# Patient Record
Sex: FEMALE | Race: WHITE | Employment: FULL TIME | URBAN - NONMETROPOLITAN AREA
[De-identification: names, ages, dates, MRNs, and addresses within clinical notes are randomized per-mention and may not be internally consistent; named-entity substitution may affect disease eponyms.]

---
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
## 2024-12-18 ENCOUNTER — TELEPHONE (OUTPATIENT)
Dept: FAMILY MEDICINE CLINIC | Age: 19
End: 2024-12-18

## 2024-12-18 NOTE — TELEPHONE ENCOUNTER
----- Message from Tejas SHELDON sent at 12/18/2024  3:09 PM EST -----  Regarding: [facility] Appointment Request  [facility] Appointment Request    Patient needs appointment for [facility] Appointment Type: New to Provider.    Patient Requested Dates(s): Anyday.  Patient Requested Time: if Friday should be a morning appointment. If any day afternoon appointments.  Provider Name: Alicia Calderon MD    Reason for Appointment Request: New Patient - Requested Provider unavailable  --------------------------------------------------------------------------------------------------------------------------    Relationship to Patient: Self     Call Back Information: OK to leave message on voicemail  Preferred Call Back Number: Phone [phone] (home)

## 2024-12-18 NOTE — TELEPHONE ENCOUNTER
Patients mother and father are patient's of yours. Are you OK with scheduling her as a new Patient.

## 2025-01-07 SDOH — HEALTH STABILITY: PHYSICAL HEALTH: ON AVERAGE, HOW MANY MINUTES DO YOU ENGAGE IN EXERCISE AT THIS LEVEL?: 50 MIN

## 2025-01-07 SDOH — HEALTH STABILITY: PHYSICAL HEALTH: ON AVERAGE, HOW MANY DAYS PER WEEK DO YOU ENGAGE IN MODERATE TO STRENUOUS EXERCISE (LIKE A BRISK WALK)?: 6 DAYS

## 2025-01-08 ENCOUNTER — OFFICE VISIT (OUTPATIENT)
Dept: FAMILY MEDICINE CLINIC | Age: 20
End: 2025-01-08
Payer: COMMERCIAL

## 2025-01-08 VITALS
SYSTOLIC BLOOD PRESSURE: 118 MMHG | WEIGHT: 161.8 LBS | BODY MASS INDEX: 21.91 KG/M2 | HEART RATE: 55 BPM | RESPIRATION RATE: 16 BRPM | DIASTOLIC BLOOD PRESSURE: 72 MMHG | HEIGHT: 72 IN | OXYGEN SATURATION: 99 %

## 2025-01-08 DIAGNOSIS — Z86.19 HISTORY OF INFECTIOUS MONONUCLEOSIS: ICD-10-CM

## 2025-01-08 DIAGNOSIS — R19.5 LOOSE STOOLS: Primary | ICD-10-CM

## 2025-01-08 DIAGNOSIS — R41.89 BRAIN FOG: ICD-10-CM

## 2025-01-08 DIAGNOSIS — L20.9 ATOPIC DERMATITIS, UNSPECIFIED TYPE: ICD-10-CM

## 2025-01-08 DIAGNOSIS — E55.9 VITAMIN D DEFICIENCY: ICD-10-CM

## 2025-01-08 DIAGNOSIS — R53.83 FATIGUE, UNSPECIFIED TYPE: ICD-10-CM

## 2025-01-08 PROCEDURE — 99215 OFFICE O/P EST HI 40 MIN: CPT | Performed by: FAMILY MEDICINE

## 2025-01-08 RX ORDER — LORATADINE 10 MG/1
10 TABLET ORAL DAILY
COMMUNITY

## 2025-01-08 SDOH — ECONOMIC STABILITY: FOOD INSECURITY: WITHIN THE PAST 12 MONTHS, THE FOOD YOU BOUGHT JUST DIDN'T LAST AND YOU DIDN'T HAVE MONEY TO GET MORE.: NEVER TRUE

## 2025-01-08 SDOH — ECONOMIC STABILITY: FOOD INSECURITY: WITHIN THE PAST 12 MONTHS, YOU WORRIED THAT YOUR FOOD WOULD RUN OUT BEFORE YOU GOT MONEY TO BUY MORE.: NEVER TRUE

## 2025-01-08 ASSESSMENT — PATIENT HEALTH QUESTIONNAIRE - PHQ9
SUM OF ALL RESPONSES TO PHQ QUESTIONS 1-9: 0
SUM OF ALL RESPONSES TO PHQ QUESTIONS 1-9: 0
1. LITTLE INTEREST OR PLEASURE IN DOING THINGS: NOT AT ALL
2. FEELING DOWN, DEPRESSED OR HOPELESS: NOT AT ALL
SUM OF ALL RESPONSES TO PHQ QUESTIONS 1-9: 0
SUM OF ALL RESPONSES TO PHQ QUESTIONS 1-9: 0
SUM OF ALL RESPONSES TO PHQ9 QUESTIONS 1 & 2: 0

## 2025-01-08 NOTE — PATIENT INSTRUCTIONS
Increasing soluble fiber:  Try adding soluble fiber to help with bowel trouble    -- 1 to 2 TBSP of flax seed meal, mixed in oatmeal or yogurt or smoothie  -- 1/2 cup oatmeal or oats (like a muslie)  -- 1 tsp of Psyllium Husk in 8oz of water and then increase to 2 tsp if not seeing results, but increase to 12 oz minimum    Take 1 of the above daily and then double up if needed    Adding  probiotics:  You will want to choose a multi-strain (usually lactobacillus and bifidus species)  --  5 billion colony forming units (CFUs) at least   Some brands more easily found locally:  --Culturelle, Florastor, Digestive Advantage  Others with good reviews:  -- RenewLife, probonix (online)    Adding collagen:   Type I and Type III    Check out \"Classical Stretch\" by Laquita Willoughby.   Technique of "[employer]"entrics to gently stretch muscle and strengthen them.    Aim to stretch several 5 -10 minutes daily if you have chronic joint trouble.  For the next level of health, aim for 30 minutes of stretching and aerobic work out 3 times a week.  She has website, streaming options and youtube videos.      Www.[employer]/videos/     Here are mini-workout examples: -- there are many others:    For neck and upper back pain,     AUTOFACT mini workout for upper body pain relief (under 5 min.)  Https://www.exozetube.com/watch?v=UHGkRxj4XxN&t=9s    AUTOFACT Workout - Upper Back and Shoulder Pain-Relief Exercises (10 min.)  https://www.youPicturaeube.com/watch?v=LhZTbuabwxg      For back pain and tight body joints,    [employer] Aging Backwards #5 - Relieve Your Pain (under 10 min.)  Https://www.youtube.com/watch?v=XDEyxiwkioQ    "[employer]"entrics Aging Backwards #3 - Sooth Your Joints  https://www.youPicturaeube.com/watch?v=SR2dorZ7TqY

## 2025-01-08 NOTE — PROGRESS NOTES
Shanti Wallace (:  2005) is a 19 y.o. female,Established patient, here for evaluation of the following chief complaint(s):  Establish Care      Assessment & Plan   ASSESSMENT/PLAN:  1. Loose stools  -     CBC with Auto Differential; Future  -     Vitamin D 25 Hydroxy; Future  -     Ferritin; Future  -     Iron; Future  2. Vitamin D deficiency  -     CBC with Auto Differential; Future  -     Vitamin D 25 Hydroxy; Future  -     Ferritin; Future  -     Iron; Future  3. Fatigue, unspecified type  -     CBC with Auto Differential; Future  -     Vitamin D 25 Hydroxy; Future  -     Ferritin; Future  -     Iron; Future  4. Atopic dermatitis, unspecified type  5. History of infectious mononucleosis  6. Brain fog    Patient's symptoms ongoing for a while but currently appear to be mild with examination that is benign.  We discussed various etiologies including those that would need further imaging and referral to GI.  She may have some lactose intolerance.  Has some food allergy -- contamination issues are considered but she is reading labels carefully.  In the absence of red flags, we will start with diet modifications and trial of supplements including increasing foods that have soluble fiber, then a trial of probiotics and then a trial of collagen.  Patient will give a month in between each trial to see what helps the most and best.  She may need a combination of all of them.  If there is no improvement we will obtain for further workup.  Blood work as above    Return in about 5 months (around 2025).         Subjective   SUBJECTIVE/OBJECTIVE:  HPI    Patient is a new patient to our clinic but has been seen within [facility] physician group for years.  She felt more comfortable coming here.  She is currently busy with work and going to school.  She is concerned about some loose stools that have become a bit more frequent.  She has concerns that she is on the road, and has felt some increased urgency to

## 2025-01-10 ENCOUNTER — LAB (OUTPATIENT)
Dept: LAB | Age: 20
End: 2025-01-10

## 2025-01-10 DIAGNOSIS — E55.9 VITAMIN D DEFICIENCY: ICD-10-CM

## 2025-01-10 DIAGNOSIS — R53.83 FATIGUE, UNSPECIFIED TYPE: ICD-10-CM

## 2025-01-10 DIAGNOSIS — R19.5 LOOSE STOOLS: ICD-10-CM

## 2025-01-10 LAB
25(OH)D3 SERPL-MCNC: 26 NG/ML (ref 30–100)
BASOPHILS ABSOLUTE: 0.1 THOU/MM3 (ref 0–0.1)
BASOPHILS NFR BLD AUTO: 0.7 %
DEPRECATED RDW RBC AUTO: 42.7 FL (ref 35–45)
EOSINOPHIL NFR BLD AUTO: 2.2 %
EOSINOPHILS ABSOLUTE: 0.2 THOU/MM3 (ref 0–0.4)
ERYTHROCYTE [DISTWIDTH] IN BLOOD BY AUTOMATED COUNT: 12 % (ref 11.5–14.5)
FERRITIN SERPL IA-MCNC: 60 NG/ML (ref 10–291)
HCT VFR BLD AUTO: 40.8 % (ref 37–47)
HGB BLD-MCNC: 13.4 GM/DL (ref 12–16)
IMM GRANULOCYTES # BLD AUTO: 0.01 THOU/MM3 (ref 0–0.07)
IMM GRANULOCYTES NFR BLD AUTO: 0.1 %
IRON SERPL-MCNC: 128 UG/DL (ref 50–170)
LYMPHOCYTES ABSOLUTE: 2.3 THOU/MM3 (ref 1–4.8)
LYMPHOCYTES NFR BLD AUTO: 30 %
MCH RBC QN AUTO: 32 PG (ref 26–33)
MCHC RBC AUTO-ENTMCNC: 32.8 GM/DL (ref 32.2–35.5)
MCV RBC AUTO: 97.4 FL (ref 81–99)
MONOCYTES ABSOLUTE: 0.7 THOU/MM3 (ref 0.4–1.3)
MONOCYTES NFR BLD AUTO: 9 %
NEUTROPHILS ABSOLUTE: 4.4 THOU/MM3 (ref 1.8–7.7)
NEUTROPHILS NFR BLD AUTO: 58 %
NRBC BLD AUTO-RTO: 0 /100 WBC
PLATELET # BLD AUTO: 275 THOU/MM3 (ref 130–400)
PMV BLD AUTO: 11.2 FL (ref 9.4–12.4)
RBC # BLD AUTO: 4.19 MILL/MM3 (ref 4.2–5.4)
WBC # BLD AUTO: 7.6 THOU/MM3 (ref 4.8–10.8)

## 2025-01-11 PROBLEM — R41.89 BRAIN FOG: Status: ACTIVE | Noted: 2025-01-11

## 2025-01-11 ASSESSMENT — ENCOUNTER SYMPTOMS
CONSTIPATION: 0
BLOOD IN STOOL: 0
COLOR CHANGE: 0
VOMITING: 0
RECTAL PAIN: 0
SHORTNESS OF BREATH: 0
DIARRHEA: 1
SORE THROAT: 0
NAUSEA: 0
ABDOMINAL PAIN: 0

## 2025-01-11 NOTE — RESULT ENCOUNTER NOTE
Please let patient know that her iron levels appear adequate but her vitamin D is not optimal.    -- she may start a daily vitamin D3 5000 units daily  -- a recheck in 3 mo is recommended.

## 2025-01-13 ENCOUNTER — TELEPHONE (OUTPATIENT)
Dept: FAMILY MEDICINE CLINIC | Age: 20
End: 2025-01-13

## 2025-01-13 NOTE — TELEPHONE ENCOUNTER
----- Message from Dr. Alicia Calderon MD sent at 1/11/2025  8:34 AM EST -----  Please let patient know that her iron levels appear adequate but her vitamin D is not optimal.    -- she may start a daily vitamin D3 5000 units daily  -- a recheck in 3 mo is recommended.

## 2025-03-14 ENCOUNTER — TELEMEDICINE (OUTPATIENT)
Dept: FAMILY MEDICINE CLINIC | Age: 20
End: 2025-03-14
Payer: COMMERCIAL

## 2025-03-14 DIAGNOSIS — L20.9 ATOPIC DERMATITIS, UNSPECIFIED TYPE: ICD-10-CM

## 2025-03-14 DIAGNOSIS — M25.50 ARTHRALGIA, UNSPECIFIED JOINT: Primary | ICD-10-CM

## 2025-03-14 DIAGNOSIS — R19.5 LOOSE STOOLS: ICD-10-CM

## 2025-03-14 DIAGNOSIS — R41.89 BRAIN FOG: ICD-10-CM

## 2025-03-14 PROCEDURE — 99214 OFFICE O/P EST MOD 30 MIN: CPT | Performed by: FAMILY MEDICINE

## 2025-03-20 ENCOUNTER — HOSPITAL ENCOUNTER (OUTPATIENT)
Age: 20
Discharge: HOME | End: 2025-03-20
Payer: COMMERCIAL

## 2025-03-20 DIAGNOSIS — M25.50 ARTHRALGIA, UNSPECIFIED JOINT: ICD-10-CM

## 2025-03-20 DIAGNOSIS — R19.5 LOOSE STOOLS: ICD-10-CM

## 2025-03-20 DIAGNOSIS — R41.89 BRAIN FOG: ICD-10-CM

## 2025-03-20 DIAGNOSIS — L20.9 ATOPIC DERMATITIS, UNSPECIFIED TYPE: ICD-10-CM

## 2025-03-20 LAB
BASOPHILS ABSOLUTE: 0 THOU/MM3 (ref 0–0.1)
BASOPHILS NFR BLD AUTO: 0.5 %
DEPRECATED RDW RBC AUTO: 45.4 FL (ref 35–45)
EOSINOPHIL NFR BLD AUTO: 2.7 %
EOSINOPHILS ABSOLUTE: 0.2 THOU/MM3 (ref 0–0.4)
ERYTHROCYTE [DISTWIDTH] IN BLOOD BY AUTOMATED COUNT: 12.8 % (ref 11.5–14.5)
ERYTHROCYTE [SEDIMENTATION RATE] IN BLOOD BY WESTERGREN METHOD: 2 MM/HR (ref 0–20)
HCT VFR BLD AUTO: 42.3 % (ref 37–47)
HGB BLD-MCNC: 13.7 GM/DL (ref 12–16)
IMM GRANULOCYTES # BLD AUTO: 0.02 THOU/MM3 (ref 0–0.07)
IMM GRANULOCYTES NFR BLD AUTO: 0.2 %
LYMPHOCYTES ABSOLUTE: 2.3 THOU/MM3 (ref 1–4.8)
LYMPHOCYTES NFR BLD AUTO: 27 %
MCH RBC QN AUTO: 31.4 PG (ref 26–33)
MCHC RBC AUTO-ENTMCNC: 32.4 GM/DL (ref 32.2–35.5)
MCV RBC AUTO: 97 FL (ref 81–99)
MONOCYTES ABSOLUTE: 0.7 THOU/MM3 (ref 0.4–1.3)
MONOCYTES NFR BLD AUTO: 8.1 %
NEUTROPHILS ABSOLUTE: 5.2 THOU/MM3 (ref 1.8–7.7)
NEUTROPHILS NFR BLD AUTO: 61.5 %
NRBC BLD AUTO-RTO: 0 /100 WBC
PLATELET # BLD AUTO: 276 THOU/MM3 (ref 130–400)
PMV BLD AUTO: 11.2 FL (ref 9.4–12.4)
RBC # BLD AUTO: 4.36 MILL/MM3 (ref 4.2–5.4)
WBC # BLD AUTO: 8.4 THOU/MM3 (ref 4.8–10.8)

## 2025-03-20 PROCEDURE — 80053 COMPREHEN METABOLIC PANEL: CPT

## 2025-03-20 PROCEDURE — 85651 RBC SED RATE NONAUTOMATED: CPT

## 2025-03-20 PROCEDURE — 86258 DGP ANTIBODY EACH IG CLASS: CPT

## 2025-03-20 PROCEDURE — 84443 ASSAY THYROID STIM HORMONE: CPT

## 2025-03-20 PROCEDURE — 86364 TISS TRNSGLTMNASE EA IG CLAS: CPT

## 2025-03-20 PROCEDURE — 86140 C-REACTIVE PROTEIN: CPT

## 2025-03-20 PROCEDURE — 82746 ASSAY OF FOLIC ACID SERUM: CPT

## 2025-03-20 PROCEDURE — 36415 COLL VENOUS BLD VENIPUNCTURE: CPT

## 2025-03-20 PROCEDURE — 85025 COMPLETE CBC W/AUTO DIFF WBC: CPT

## 2025-03-20 PROCEDURE — 82607 VITAMIN B-12: CPT

## 2025-03-20 PROCEDURE — 86038 ANTINUCLEAR ANTIBODIES: CPT

## 2025-03-20 PROCEDURE — 86039 ANTINUCLEAR ANTIBODIES (ANA): CPT

## 2025-03-21 LAB
ALBUMIN SERPL BCG-MCNC: 4.6 G/DL (ref 3.4–4.9)
ALP SERPL-CCNC: 65 U/L (ref 35–104)
ALT SERPL W/O P-5'-P-CCNC: 19 U/L (ref 10–35)
ANION GAP SERPL CALC-SCNC: 11 MEQ/L (ref 8–16)
AST SERPL-CCNC: 19 U/L (ref 10–35)
BILIRUB SERPL-MCNC: 0.2 MG/DL (ref 0.3–1.2)
BUN SERPL-MCNC: 14 MG/DL (ref 8–23)
CALCIUM SERPL-MCNC: 9.9 MG/DL (ref 8.6–10)
CHLORIDE SERPL-SCNC: 102 MEQ/L (ref 98–111)
CO2 SERPL-SCNC: 26 MEQ/L (ref 22–29)
CREAT SERPL-MCNC: 0.8 MG/DL (ref 0.5–0.9)
CRP SERPL-MCNC: < 0.3 MG/DL (ref 0–0.5)
FOLATE SERPL-MCNC: 7.1 NG/ML (ref 4.6–34.8)
GFR SERPL CREATININE-BSD FRML MDRD: > 90 ML/MIN/1.73M2
GLUCOSE SERPL-MCNC: 80 MG/DL (ref 74–109)
POTASSIUM SERPL-SCNC: 4.1 MEQ/L (ref 3.5–5.2)
PROT SERPL-MCNC: 7.5 G/DL (ref 6.4–8.3)
SODIUM SERPL-SCNC: 139 MEQ/L (ref 135–145)
TSH SERPL DL<=0.05 MIU/L-ACNC: 2.37 UIU/ML (ref 0.27–4.2)
VIT B12 SERPL-MCNC: 568 PG/ML (ref 232–1245)

## 2025-03-23 LAB
GLIADIN PEPTIDE IGA SER IA-ACNC: < 0.72 FLU (ref 0–4.99)
GLIADIN PEPTIDE IGG SER IA-ACNC: 0.76 FLU (ref 0–4.99)
NUCLEAR IGG SER QL IA: DETECTED
TTG IGA SER IA-ACNC: < 1.02 FLU (ref 0–4.99)
TTG IGG SER IA-ACNC: < 0.82 FLU (ref 0–4.99)

## 2025-03-24 LAB
ANA PAT SER IF-IMP: ABNORMAL
ANA PAT SER IF-IMP: ABNORMAL
NUCLEAR IGG SER QL IF: DETECTED
NUCLEAR IGG TITR SER IF: ABNORMAL {TITER}

## 2025-03-26 ENCOUNTER — PATIENT MESSAGE (OUTPATIENT)
Dept: FAMILY MEDICINE CLINIC | Age: 20
End: 2025-03-26

## 2025-03-26 ENCOUNTER — RESULTS FOLLOW-UP (OUTPATIENT)
Dept: FAMILY MEDICINE CLINIC | Age: 20
End: 2025-03-26

## 2025-05-05 ENCOUNTER — TELEMEDICINE (OUTPATIENT)
Dept: FAMILY MEDICINE CLINIC | Age: 20
End: 2025-05-05
Payer: COMMERCIAL

## 2025-05-05 DIAGNOSIS — J30.1 SEASONAL ALLERGIC RHINITIS DUE TO POLLEN: ICD-10-CM

## 2025-05-05 DIAGNOSIS — M25.50 ARTHRALGIA, UNSPECIFIED JOINT: ICD-10-CM

## 2025-05-05 DIAGNOSIS — M54.50 LUMBAR BACK PAIN: ICD-10-CM

## 2025-05-05 DIAGNOSIS — R76.8 ANA POSITIVE: ICD-10-CM

## 2025-05-05 DIAGNOSIS — R41.89 BRAIN FOG: ICD-10-CM

## 2025-05-05 DIAGNOSIS — M53.3 BACK PAIN, SACROILIAC: ICD-10-CM

## 2025-05-05 DIAGNOSIS — L50.9 HIVES: Primary | ICD-10-CM

## 2025-05-05 PROCEDURE — 99213 OFFICE O/P EST LOW 20 MIN: CPT | Performed by: FAMILY MEDICINE

## 2025-05-05 RX ORDER — PREDNISONE 10 MG/1
10 TABLET ORAL DAILY
Qty: 45 TABLET | Refills: 0 | Status: SHIPPED | OUTPATIENT
Start: 2025-05-05

## 2025-05-05 NOTE — PROGRESS NOTES
Shanti Wallace, was evaluated through a synchronous (real-time) audio-video encounter. The patient (or guardian if applicable) is aware that this is a billable service, which includes applicable co-pays. This Virtual Visit was conducted with patient's (and/or legal guardian's) consent. Patient identification was verified, and a caregiver was present when appropriate.   The patient was located at Home: [address]  [place] [address]  Provider was located at Facility (Appt Dept): [address]  [place] [address]  Confirm you are appropriately licensed, registered, or certified to deliver care in the state where the patient is located as indicated above. If you are not or unsure, please re-schedule the visit: Yes, I confirm.     Shanti Wallace (:  2005) is a Established patient, presenting virtually for evaluation of the following:      Below is the assessment and plan developed based on review of pertinent history, physical exam, labs, studies, and medications.     Diagnoses and all orders for this visit:    Hives  -     predniSONE (DELTASONE) 10 MG tablet; Take 1 tablet by mouth daily Take 5 tabs daily for 3 days, then 4 tabs daily for 3 days, then 3 tabs daily for 3 days, then 2 tabs daily for 3 days, then 1 tab daily for 3 days.    Arthralgia, unspecified joint  -     predniSONE (DELTASONE) 10 MG tablet; Take 1 tablet by mouth daily Take 5 tabs daily for 3 days, then 4 tabs daily for 3 days, then 3 tabs daily for 3 days, then 2 tabs daily for 3 days, then 1 tab daily for 3 days.    JANINE positive  -     predniSONE (DELTASONE) 10 MG tablet; Take 1 tablet by mouth daily Take 5 tabs daily for 3 days, then 4 tabs daily for 3 days, then 3 tabs daily for 3 days, then 2 tabs daily for 3 days, then 1 tab daily for 3 days.    Back pain, sacroiliac  -     predniSONE (DELTASONE) 10 MG tablet; Take 1 tablet by mouth daily Take 5 tabs daily for 3 days, then 4 tabs daily for 3 days,

## 2025-07-28 ENCOUNTER — HOSPITAL ENCOUNTER (OUTPATIENT)
Age: 20
Discharge: HOME | End: 2025-07-28
Payer: COMMERCIAL

## 2025-07-28 ENCOUNTER — HOSPITAL ENCOUNTER (OUTPATIENT)
Dept: GENERAL RADIOLOGY | Age: 20
Discharge: HOME | End: 2025-07-28
Payer: COMMERCIAL

## 2025-07-28 ENCOUNTER — OFFICE VISIT (OUTPATIENT)
Age: 20
End: 2025-07-28
Payer: COMMERCIAL

## 2025-07-28 VITALS
WEIGHT: 153.3 LBS | DIASTOLIC BLOOD PRESSURE: 72 MMHG | HEART RATE: 77 BPM | OXYGEN SATURATION: 97 % | SYSTOLIC BLOOD PRESSURE: 120 MMHG | BODY MASS INDEX: 20.77 KG/M2 | HEIGHT: 72 IN

## 2025-07-28 DIAGNOSIS — R76.8 POSITIVE ANA (ANTINUCLEAR ANTIBODY): ICD-10-CM

## 2025-07-28 DIAGNOSIS — R76.8 POSITIVE ANA (ANTINUCLEAR ANTIBODY): Primary | ICD-10-CM

## 2025-07-28 DIAGNOSIS — M25.50 MULTIPLE JOINT PAIN: ICD-10-CM

## 2025-07-28 DIAGNOSIS — K52.9 CHRONIC DIARRHEA: ICD-10-CM

## 2025-07-28 LAB
CRP SERPL-MCNC: < 0.3 MG/DL (ref 0–0.5)
ERYTHROCYTE [SEDIMENTATION RATE] IN BLOOD BY WESTERGREN METHOD: 1 MM/HR (ref 0–20)

## 2025-07-28 PROCEDURE — 36415 COLL VENOUS BLD VENIPUNCTURE: CPT

## 2025-07-28 PROCEDURE — 73562 X-RAY EXAM OF KNEE 3: CPT

## 2025-07-28 PROCEDURE — 99204 OFFICE O/P NEW MOD 45 MIN: CPT | Performed by: INTERNAL MEDICINE

## 2025-07-28 PROCEDURE — 85651 RBC SED RATE NONAUTOMATED: CPT

## 2025-07-28 PROCEDURE — 72202 X-RAY EXAM SI JOINTS 3/> VWS: CPT

## 2025-07-28 PROCEDURE — 86140 C-REACTIVE PROTEIN: CPT

## 2025-07-28 PROCEDURE — 86160 COMPLEMENT ANTIGEN: CPT

## 2025-07-28 RX ORDER — FERROUS SULFATE 325(65) MG
325 TABLET ORAL
COMMUNITY

## 2025-07-28 ASSESSMENT — RHEUMATOLOGY NEW PATIENT QUESTIONNAIRE
UNUSUAL FATIGUE: Y
INCREASED SUSCEPTIBILITY TO INFECTION: Y
DRYNESS OF MOUTH: N
HOARSE VOICE: N
ANXIETY: Y
SUN SENSITIVE (SUN ALLERGY): N
DEPRESSION: N
NUMBNESS OR TINGLING IN HANDS OR FEET: N
SKIN REDNESS: Y
VAGINAL DRYNESS: N
RASH OR ULCERS: N
JOINT SWELLING: N
SHORTNESS OF BREATH: N
JAUNDICE: N
JOINT PAIN: Y
MUSCLE WEAKNESS: N
FEVER: N
PERSISTENT DIARRHEA: Y
MORNING STIFFNESS: N
SEIZURES: N
SKIN TIGHTNESS: N
SORES IN MOUTH OR NOSE: N
HEARTBURN OR REFLUX: N
ABNORMAL URINE: Y
PAIN OR BURNING ON URINATION: N
UNUSUAL BLEEDING: Y
DIFFICULTY BREATHING LYING DOWN: N
NIGHT SWEATS: N
SWOLLEN OR TENDER GLANDS: Y
NODULES/BUMPS: Y
HEADACHES: Y
RASH: Y
DIFFICULTY FALLING ASLEEP: N
AGITATION: Y
CHEST PAIN: N
ANEMIA: Y
EASY BRUISING: Y
MEMORY LOSS: Y
BEHAVIORAL CHANGES: N
LOSS OF CONSCIOUSNESS: N
EASILY LOSING TEMPER: Y
FAINTING: N
COUGH: N
EYE PAIN: N
EXCESSIVE HAIR LOSS (MORE THAN YOUR NORM): Y
BLOOD IN STOOLS: N
LOSS OF VISION: N
DOUBLE OR BLURRED VISION: Y
SWOLLEN LEGS OR FEET: N
DIFFICULTY SWALLOWING: N
EYE REDNESS: Y
UNUSUALLY RAPID OR SLOWED HEART RATE: Y
EYE DRYNESS: Y
BLACK STOOLS: Y
DIFFICULTY STAYING ASLEEP: N
UNEXPLAINED HEARING LOSS: N
STOMACH PAIN: Y
COLOR CHANGES OF HANDS OR FEET IN THE COLD: Y
UNEXPLAINED WEIGHT CHANGE: N
VOMITING OF BLOOD OR COFFEE GROUND CONSISTENCY MATERIAL: N
NAUSEA: Y

## 2025-07-28 ASSESSMENT — ENCOUNTER SYMPTOMS
SHORTNESS OF BREATH: 0
ABDOMINAL PAIN: 1
BLOOD IN STOOL: 0
VOMITING: 0
DIARRHEA: 1
NAUSEA: 1
EYE ITCHING: 1
COUGH: 0

## 2025-07-28 NOTE — PROGRESS NOTES
[facility]   Rheumatology Clinic Note      7/28/2025       Reason for Consult:  positive JANINE  Requesting Physician:  Alicia Calderon MD     CHIEF COMPLAINT:    Chief Complaint   Patient presents with    New Patient     Back pain, sacroiliac , Lumbar back pain , Arthralgia, unspecified joint , JANINE positive        Other     Patient has been experiencing digestive since the age of 15 which has worsened in 2/2025. Patient has not seen Gastro regarding this matter. Patient has not had solid stool since the age of 15. The beginning of this year, she was not able to hold her bowels for five minutes.  Joint pain present right around the same time. The SI joints and ankles are the worsened areas. She also has pain bilateral knees. No fractures. Recent XR of Lt ankle completed in March. Family H/O arthritis.    Rash     She will have episodes on the neck, bilateral arms, facial and armpits.          HISTORY OF PRESENT ILLNESS:    20 y.o. female presents today for evaluation of positive JANINE.     Reports having digestive issues since she was 15. Worsening recently. Reports having very loose stool. 2-5 BM loose, sometimes green or black.     Also reports having joint pain.  Started having joint pain that started in February. Initially started in both knees. This has been ongoing since then, on and off. No swelling.  In March, woke up one day with severe pain in the left ankle, no associated joint swelling.  Also reports having pain in SI joints.  Pain in left jaw.  Was prescribed steroid course in May 2025 that helped her joint pain significantly.  Morning stiffness for 20 minutes     Has skin rashes. Face, arm pit and other areas. Pruritic. Was diagnosed with atopic dermatitis in childhood    Episodes of oral ulcers. Episodic.  Worsening rash when going outdoors.  Hair thinning.    Has brain fog. Excessive fatigue.  Sleeps well 8-9 hours.      Past Medical History:     has a past medical history of Atopic

## 2025-07-29 LAB
C3C SERPL-MCNC: 98 MG/DL (ref 90–180)
C4 SERPL-MCNC: 15 MG/DL (ref 10–40)

## 2025-08-11 ENCOUNTER — OFFICE VISIT (OUTPATIENT)
Age: 20
End: 2025-08-11
Payer: COMMERCIAL

## 2025-08-11 VITALS
DIASTOLIC BLOOD PRESSURE: 72 MMHG | SYSTOLIC BLOOD PRESSURE: 118 MMHG | HEART RATE: 64 BPM | HEIGHT: 72 IN | WEIGHT: 153.22 LBS | OXYGEN SATURATION: 99 % | BODY MASS INDEX: 20.75 KG/M2

## 2025-08-11 DIAGNOSIS — K52.9 CHRONIC DIARRHEA: Primary | ICD-10-CM

## 2025-08-11 DIAGNOSIS — R76.8 POSITIVE ANA (ANTINUCLEAR ANTIBODY): ICD-10-CM

## 2025-08-11 DIAGNOSIS — M53.3 SACROILIAC JOINT PAIN: ICD-10-CM

## 2025-08-11 PROCEDURE — 99214 OFFICE O/P EST MOD 30 MIN: CPT | Performed by: INTERNAL MEDICINE

## 2025-08-11 ASSESSMENT — ENCOUNTER SYMPTOMS
EYE ITCHING: 1
ABDOMINAL PAIN: 1
BLOOD IN STOOL: 0
VOMITING: 0
DIARRHEA: 1
SHORTNESS OF BREATH: 0
NAUSEA: 1
COUGH: 0

## 2025-08-19 ENCOUNTER — HOSPITAL ENCOUNTER (OUTPATIENT)
Age: 20
Discharge: HOME | End: 2025-08-19
Payer: COMMERCIAL

## 2025-08-19 PROCEDURE — 83630 LACTOFERRIN FECAL (QUAL): CPT

## 2025-08-19 PROCEDURE — 83993 ASSAY FOR CALPROTECTIN FECAL: CPT

## 2025-08-19 PROCEDURE — 87045 FECES CULTURE AEROBIC BACT: CPT

## 2025-08-19 PROCEDURE — 87177 OVA AND PARASITES SMEARS: CPT

## 2025-08-19 PROCEDURE — 87507 IADNA-DNA/RNA PROBE TQ 12-25: CPT

## 2025-08-19 PROCEDURE — 87493 C DIFF AMPLIFIED PROBE: CPT

## 2025-08-19 PROCEDURE — 83520 IMMUNOASSAY QUANT NOS NONAB: CPT

## 2025-08-19 PROCEDURE — 87427 SHIGA-LIKE TOXIN AG IA: CPT

## 2025-08-20 LAB
C CAYETANENSIS DNA SPEC QL NAA+PROBE: NOT DETECTED
CAMPY SP DNA.DIARRHEA STL QL NAA+PROBE: NOT DETECTED
CRYPTOSP DNA SPEC QL NAA+PROBE: NOT DETECTED
E COLI O157H7 DNA SPEC QL NAA+PROBE: NORMAL
E HISTOLYT DNA SPEC QL NAA+PROBE: NOT DETECTED
EAEC PAA PLAS AGGR+AATA ST NAA+NON-PRB: NOT DETECTED
EC STX1+STX2 + H7 FLIC SPEC NAA+PROBE: NOT DETECTED
EPEC EAE GENE STL QL NAA+NON-PROBE: NOT DETECTED
ETEC LTA+ST1A+ST1B TOX ST NAA+NON-PROBE: NOT DETECTED
G LAMBLIA DNA SPEC QL NAA+PROBE: NOT DETECTED
HADV DNA SPEC QL NAA+PROBE: NOT DETECTED
HASTV RNA SPEC QL NAA+PROBE: NOT DETECTED
NOROVIRUS GI + GII RNA STL NAA+PROBE: NOT DETECTED
P SHIGELLOIDES DNA STL QL NAA+PROBE: NOT DETECTED
RV RNA SPEC QL NAA+PROBE: NOT DETECTED
SALMONELLA DNA SPEC QL NAA+PROBE: NOT DETECTED
SAPOVIRUS RNA SPEC QL NAA+PROBE: NOT DETECTED
SHIGELLA SP+EIEC IPAH ST NAA+NON-PROBE: NOT DETECTED
V CHOLERAE DNA SPEC QL NAA+PROBE: NOT DETECTED
VIBRIO DNA SPEC NAA+PROBE: NOT DETECTED
Y ENTERO RECN STL QL NAA+PROBE: NOT DETECTED

## 2025-08-21 LAB
C DIFFICILE TOXINS, PCR: NORMAL
O+P STL TRI STN: NORMAL

## 2025-08-22 LAB — BACTERIA STL CULT: NORMAL

## 2025-08-23 LAB
CALPROTECTIN STL-MCNT: 44 UG/G
ELASTASE PANC STL-MCNT: 737 UG/G
LACTOFERRIN STL QL IA: NORMAL

## 2025-08-25 ENCOUNTER — PATIENT MESSAGE (OUTPATIENT)
Age: 20
End: 2025-08-25